# Patient Record
Sex: MALE | Race: WHITE | NOT HISPANIC OR LATINO | Employment: OTHER | URBAN - METROPOLITAN AREA
[De-identification: names, ages, dates, MRNs, and addresses within clinical notes are randomized per-mention and may not be internally consistent; named-entity substitution may affect disease eponyms.]

---

## 2023-08-07 PROBLEM — U07.1 COVID-19 VIRUS DETECTED: Status: ACTIVE | Noted: 2023-08-07

## 2023-08-07 PROBLEM — U07.1 DISEASE DUE TO SEVERE ACUTE RESPIRATORY SYNDROME CORONAVIRUS 2 (SARS-COV-2): Status: ACTIVE | Noted: 2023-08-07

## 2023-08-07 PROBLEM — J45.909 ASTHMA (HHS-HCC): Status: ACTIVE | Noted: 2023-08-07

## 2023-08-07 PROBLEM — J45.901 ASTHMA EXACERBATION (HHS-HCC): Status: ACTIVE | Noted: 2023-08-07

## 2023-08-07 PROBLEM — K63.5 COLON POLYPS: Status: ACTIVE | Noted: 2023-08-07

## 2023-08-07 PROBLEM — I10 HYPERTENSION: Status: ACTIVE | Noted: 2023-08-07

## 2023-08-07 PROBLEM — D12.6 COLON ADENOMA: Status: ACTIVE | Noted: 2023-08-07

## 2023-08-07 PROBLEM — R06.2 WHEEZING: Status: ACTIVE | Noted: 2023-08-07

## 2023-08-07 PROBLEM — F41.9 ANXIETY: Status: ACTIVE | Noted: 2023-08-07

## 2023-08-07 PROBLEM — F32.A DEPRESSION: Status: ACTIVE | Noted: 2023-08-07

## 2023-08-07 PROBLEM — M54.32 SCIATICA OF LEFT SIDE: Status: ACTIVE | Noted: 2023-08-07

## 2023-08-07 PROBLEM — I82.409 DVT (DEEP VENOUS THROMBOSIS) (MULTI): Status: ACTIVE | Noted: 2023-08-07

## 2023-08-07 PROBLEM — G47.00 INSOMNIA: Status: ACTIVE | Noted: 2023-08-07

## 2023-08-07 RX ORDER — BUDESONIDE AND FORMOTEROL FUMARATE DIHYDRATE 160; 4.5 UG/1; UG/1
AEROSOL RESPIRATORY (INHALATION) 2 TIMES DAILY
COMMUNITY
Start: 2019-08-08

## 2023-08-07 RX ORDER — PREDNISONE 20 MG/1
2 TABLET ORAL DAILY
COMMUNITY
Start: 2021-05-13 | End: 2023-08-21 | Stop reason: ALTCHOICE

## 2023-08-07 RX ORDER — AMLODIPINE BESYLATE 10 MG/1
1 TABLET ORAL DAILY
COMMUNITY
Start: 2019-09-06 | End: 2023-08-21 | Stop reason: ALTCHOICE

## 2023-08-07 RX ORDER — OXYCODONE AND ACETAMINOPHEN 5; 325 MG/1; MG/1
TABLET ORAL EVERY 6 HOURS
COMMUNITY
Start: 2021-09-21 | End: 2023-08-21 | Stop reason: ALTCHOICE

## 2023-08-07 RX ORDER — TRAZODONE HYDROCHLORIDE 100 MG/1
1 TABLET ORAL NIGHTLY PRN
COMMUNITY
Start: 2020-04-15

## 2023-08-07 RX ORDER — SOD SULF/POT CHLORIDE/MAG SULF 1.479 G
TABLET ORAL
COMMUNITY
Start: 2022-06-17 | End: 2023-08-21 | Stop reason: ALTCHOICE

## 2023-08-07 RX ORDER — MONTELUKAST SODIUM 10 MG/1
1 TABLET ORAL NIGHTLY
COMMUNITY
Start: 2019-09-06 | End: 2024-05-02

## 2023-08-07 RX ORDER — BUDESONIDE AND FORMOTEROL FUMARATE DIHYDRATE 80; 4.5 UG/1; UG/1
2 AEROSOL RESPIRATORY (INHALATION) 2 TIMES DAILY
COMMUNITY
Start: 2021-05-13

## 2023-08-07 RX ORDER — LEVOTHYROXINE SODIUM 75 UG/1
1 TABLET ORAL DAILY
COMMUNITY

## 2023-08-07 RX ORDER — ALBUTEROL SULFATE 0.83 MG/ML
SOLUTION RESPIRATORY (INHALATION)
COMMUNITY
Start: 2023-08-04

## 2023-08-07 RX ORDER — LEVOFLOXACIN 750 MG/1
1 TABLET ORAL DAILY
COMMUNITY
Start: 2019-08-08 | End: 2023-08-21 | Stop reason: ALTCHOICE

## 2023-08-07 RX ORDER — CETIRIZINE HYDROCHLORIDE 10 MG/1
1 TABLET ORAL DAILY
COMMUNITY

## 2023-08-07 RX ORDER — IBUPROFEN 200 MG
TABLET ORAL EVERY 24 HOURS
COMMUNITY
Start: 2019-08-08 | End: 2023-08-21 | Stop reason: ALTCHOICE

## 2023-08-07 RX ORDER — DESVENLAFAXINE 50 MG/1
1 TABLET, EXTENDED RELEASE ORAL DAILY
COMMUNITY
Start: 2023-01-26 | End: 2023-08-21 | Stop reason: ALTCHOICE

## 2023-08-07 RX ORDER — ASCORBIC ACID 500 MG
1 TABLET ORAL DAILY
COMMUNITY

## 2023-08-07 RX ORDER — FLUTICASONE FUROATE AND VILANTEROL 100; 25 UG/1; UG/1
1 POWDER RESPIRATORY (INHALATION) DAILY
COMMUNITY
Start: 2021-05-17

## 2023-08-21 ENCOUNTER — OFFICE VISIT (OUTPATIENT)
Dept: PRIMARY CARE | Facility: CLINIC | Age: 63
End: 2023-08-21
Payer: COMMERCIAL

## 2023-08-21 VITALS
BODY MASS INDEX: 28.77 KG/M2 | TEMPERATURE: 97.7 F | OXYGEN SATURATION: 94 % | HEART RATE: 63 BPM | DIASTOLIC BLOOD PRESSURE: 76 MMHG | WEIGHT: 179 LBS | HEIGHT: 66 IN | SYSTOLIC BLOOD PRESSURE: 134 MMHG

## 2023-08-21 DIAGNOSIS — F32.A DEPRESSION, UNSPECIFIED DEPRESSION TYPE: Primary | ICD-10-CM

## 2023-08-21 PROCEDURE — 99213 OFFICE O/P EST LOW 20 MIN: CPT | Performed by: FAMILY MEDICINE

## 2023-08-21 PROCEDURE — 3078F DIAST BP <80 MM HG: CPT | Performed by: FAMILY MEDICINE

## 2023-08-21 PROCEDURE — 3075F SYST BP GE 130 - 139MM HG: CPT | Performed by: FAMILY MEDICINE

## 2023-08-21 RX ORDER — ASPIRIN 81 MG/1
81 TABLET ORAL
COMMUNITY

## 2023-08-21 RX ORDER — BUPROPION HYDROCHLORIDE 300 MG/1
300 TABLET ORAL EVERY MORNING
Qty: 30 TABLET | Refills: 11 | Status: SHIPPED | OUTPATIENT
Start: 2023-08-21 | End: 2023-09-19 | Stop reason: ALTCHOICE

## 2023-08-21 RX ORDER — POLYETHYLENE GLYCOL 3350, SODIUM CHLORIDE, SODIUM BICARBONATE, POTASSIUM CHLORIDE 420; 11.2; 5.72; 1.48 G/4L; G/4L; G/4L; G/4L
POWDER, FOR SOLUTION ORAL
COMMUNITY
Start: 2019-01-21 | End: 2023-08-21 | Stop reason: ALTCHOICE

## 2023-08-21 RX ORDER — ATORVASTATIN CALCIUM 80 MG/1
80 TABLET, FILM COATED ORAL
COMMUNITY
Start: 2019-03-28

## 2023-08-21 ASSESSMENT — PAIN SCALES - GENERAL: PAINLEVEL: 0-NO PAIN

## 2023-08-21 NOTE — PATIENT INSTRUCTIONS
It was nice to see you today!  Discussed current concerns and addressed   Reviewed recent labs and diagnostics  Reviewed medications list  Continue to eat a healthy diet, exercise at least 3 times a week or more  Plan and follow up discussed  For any further information related to your condition, copy and paste or go to [email or web address]  Start welbutrin  Counseling highly recommended

## 2023-08-22 ASSESSMENT — ENCOUNTER SYMPTOMS
CONFUSION: 0
JOINT SWELLING: 0
PALPITATIONS: 0
DYSPHORIC MOOD: 1
DECREASED CONCENTRATION: 0
DIZZINESS: 0
FREQUENCY: 0
SORE THROAT: 0
BLOOD IN STOOL: 0
VOMITING: 0
UNEXPECTED WEIGHT CHANGE: 0
HEADACHES: 0
COUGH: 0
NUMBNESS: 0
FEVER: 0
FATIGUE: 0
NAUSEA: 0
DIARRHEA: 0
ABDOMINAL PAIN: 0
HEMATURIA: 0
WEAKNESS: 0
TROUBLE SWALLOWING: 0
DYSURIA: 0
SHORTNESS OF BREATH: 0
EYE PAIN: 0
HALLUCINATIONS: 0

## 2023-08-22 NOTE — PROGRESS NOTES
Subjective   Patient ID: Wiliam Sim is a 63 y.o. male.    Patient comes in today to discuss medications.  He has been going through a lot of depression recently, he is caring for elderly parents and he is never quite done well with serotonin reuptake inhibitors.  He was on Wellbutrin years ago and would like to try it again.  He is not suicidal or homicidal.  He has good social support.  He is not smoking or drinking excessively.        Review of Systems   Constitutional:  Negative for fatigue, fever and unexpected weight change.   HENT:  Negative for congestion, ear pain, hearing loss, sore throat and trouble swallowing.    Eyes:  Negative for pain and visual disturbance.   Respiratory:  Negative for cough and shortness of breath.    Cardiovascular:  Negative for chest pain, palpitations and leg swelling.   Gastrointestinal:  Negative for abdominal pain, blood in stool, diarrhea, nausea and vomiting.   Genitourinary:  Negative for dysuria, frequency, hematuria and urgency.   Musculoskeletal:  Negative for joint swelling.   Skin:  Negative for pallor and rash.   Neurological:  Negative for dizziness, syncope, weakness, numbness and headaches.   Psychiatric/Behavioral:  Positive for dysphoric mood. Negative for confusion, decreased concentration, hallucinations and suicidal ideas.      Vitals:    08/21/23 1513   BP: 134/76   Pulse: 63   Temp: 36.5 °C (97.7 °F)   SpO2: 94%      Objective   Physical Exam  Constitutional:       Appearance: Normal appearance.   Cardiovascular:      Rate and Rhythm: Normal rate and regular rhythm.      Heart sounds: Normal heart sounds.   Pulmonary:      Effort: Pulmonary effort is normal.      Breath sounds: Normal breath sounds.   Musculoskeletal:      Cervical back: Neck supple.   Skin:     General: Skin is warm and dry.   Neurological:      General: No focal deficit present.      Mental Status: He is alert.   Psychiatric:         Mood and Affect: Mood normal.         Speech:  Speech normal.         Behavior: Behavior normal.         Cognition and Memory: Cognition normal.         Assessment/Plan   Diagnoses and all orders for this visit:  Depression, unspecified depression type  -     buPROPion XL (Wellbutrin XL) 300 mg 24 hr tablet; Take 1 tablet (300 mg) by mouth once daily in the morning. Do not crush, chew, or split.

## 2023-09-18 ENCOUNTER — TELEPHONE (OUTPATIENT)
Dept: PRIMARY CARE | Facility: CLINIC | Age: 63
End: 2023-09-18
Payer: COMMERCIAL

## 2023-09-18 DIAGNOSIS — F41.9 ANXIETY: Primary | ICD-10-CM

## 2023-09-19 RX ORDER — BUPROPION HYDROCHLORIDE 75 MG/1
75 TABLET ORAL DAILY
Qty: 30 TABLET | Refills: 2 | Status: SHIPPED | OUTPATIENT
Start: 2023-09-19 | End: 2023-10-12

## 2023-10-11 DIAGNOSIS — F41.9 ANXIETY: ICD-10-CM

## 2023-10-12 RX ORDER — BUPROPION HYDROCHLORIDE 75 MG/1
75 TABLET ORAL DAILY
Qty: 90 TABLET | Refills: 1 | Status: SHIPPED | OUTPATIENT
Start: 2023-10-12 | End: 2023-12-21 | Stop reason: ALTCHOICE

## 2023-11-08 PROBLEM — J45.20 MILD INTERMITTENT ASTHMA WITHOUT COMPLICATION (HHS-HCC): Status: ACTIVE | Noted: 2017-11-22

## 2023-11-08 PROBLEM — E78.5 DYSLIPIDEMIA: Status: ACTIVE | Noted: 2017-11-22

## 2023-11-08 PROBLEM — Z86.73 HISTORY OF TIA (TRANSIENT ISCHEMIC ATTACK): Status: ACTIVE | Noted: 2017-11-22

## 2023-11-08 PROBLEM — E03.8 OTHER SPECIFIED HYPOTHYROIDISM: Status: ACTIVE | Noted: 2019-02-12

## 2023-11-08 RX ORDER — AMLODIPINE BESYLATE 10 MG/1
10 TABLET ORAL DAILY
COMMUNITY

## 2023-11-08 RX ORDER — PREDNISONE 20 MG/1
2 TABLET ORAL DAILY
COMMUNITY
End: 2024-01-09 | Stop reason: ALTCHOICE

## 2023-11-08 RX ORDER — DESVENLAFAXINE 50 MG/1
50 TABLET, EXTENDED RELEASE ORAL DAILY
COMMUNITY
End: 2024-01-09 | Stop reason: ALTCHOICE

## 2023-11-09 ENCOUNTER — HOSPITAL ENCOUNTER (OUTPATIENT)
Dept: RESPIRATORY THERAPY | Facility: HOSPITAL | Age: 63
End: 2023-11-09
Payer: COMMERCIAL

## 2023-11-09 DIAGNOSIS — J45.30 MILD PERSISTENT ASTHMA WITHOUT COMPLICATION (HHS-HCC): ICD-10-CM

## 2023-11-16 ENCOUNTER — HOSPITAL ENCOUNTER (OUTPATIENT)
Dept: RESPIRATORY THERAPY | Facility: HOSPITAL | Age: 63
End: 2023-11-16
Payer: COMMERCIAL

## 2023-12-08 ENCOUNTER — APPOINTMENT (OUTPATIENT)
Dept: PULMONOLOGY | Facility: CLINIC | Age: 63
End: 2023-12-08
Payer: COMMERCIAL

## 2023-12-18 ENCOUNTER — APPOINTMENT (OUTPATIENT)
Dept: RADIOLOGY | Facility: HOSPITAL | Age: 63
End: 2023-12-18
Payer: COMMERCIAL

## 2023-12-18 ENCOUNTER — APPOINTMENT (OUTPATIENT)
Dept: CARDIOLOGY | Facility: HOSPITAL | Age: 63
End: 2023-12-18
Payer: COMMERCIAL

## 2023-12-18 ENCOUNTER — HOSPITAL ENCOUNTER (EMERGENCY)
Facility: HOSPITAL | Age: 63
Discharge: HOME | End: 2023-12-18
Attending: STUDENT IN AN ORGANIZED HEALTH CARE EDUCATION/TRAINING PROGRAM
Payer: COMMERCIAL

## 2023-12-18 VITALS
HEIGHT: 66 IN | HEART RATE: 108 BPM | OXYGEN SATURATION: 93 % | BODY MASS INDEX: 29.73 KG/M2 | SYSTOLIC BLOOD PRESSURE: 142 MMHG | TEMPERATURE: 98.4 F | DIASTOLIC BLOOD PRESSURE: 84 MMHG | WEIGHT: 185 LBS | RESPIRATION RATE: 23 BRPM

## 2023-12-18 DIAGNOSIS — K70.31 ALCOHOLIC CIRRHOSIS OF LIVER WITH ASCITES (MULTI): ICD-10-CM

## 2023-12-18 DIAGNOSIS — F10.90 ALCOHOL USE DISORDER: Primary | ICD-10-CM

## 2023-12-18 LAB
ALBUMIN SERPL BCP-MCNC: 3.8 G/DL (ref 3.4–5)
ALP SERPL-CCNC: 77 U/L (ref 33–136)
ALT SERPL W P-5'-P-CCNC: 83 U/L (ref 10–52)
ANION GAP SERPL CALC-SCNC: 18 MMOL/L (ref 10–20)
APAP SERPL-MCNC: <10 UG/ML
AST SERPL W P-5'-P-CCNC: 163 U/L (ref 9–39)
BASOPHILS # BLD AUTO: 0.18 X10*3/UL (ref 0–0.1)
BASOPHILS NFR BLD AUTO: 1.7 %
BILIRUB SERPL-MCNC: 2.5 MG/DL (ref 0–1.2)
BUN SERPL-MCNC: 6 MG/DL (ref 6–23)
CALCIUM SERPL-MCNC: 8.7 MG/DL (ref 8.6–10.3)
CARDIAC TROPONIN I PNL SERPL HS: 8 NG/L (ref 0–20)
CHLORIDE SERPL-SCNC: 96 MMOL/L (ref 98–107)
CO2 SERPL-SCNC: 26 MMOL/L (ref 21–32)
CREAT SERPL-MCNC: 0.6 MG/DL (ref 0.5–1.3)
D DIMER PPP FEU-MCNC: 4548 NG/ML FEU
EOSINOPHIL # BLD AUTO: 0.22 X10*3/UL (ref 0–0.7)
EOSINOPHIL NFR BLD AUTO: 2 %
ERYTHROCYTE [DISTWIDTH] IN BLOOD BY AUTOMATED COUNT: 13.3 % (ref 11.5–14.5)
ETHANOL SERPL-MCNC: 368 MG/DL
GFR SERPL CREATININE-BSD FRML MDRD: >90 ML/MIN/1.73M*2
GLUCOSE SERPL-MCNC: 149 MG/DL (ref 74–99)
HCT VFR BLD AUTO: 40.7 % (ref 41–52)
HGB BLD-MCNC: 13.9 G/DL (ref 13.5–17.5)
IMM GRANULOCYTES # BLD AUTO: 0.05 X10*3/UL (ref 0–0.7)
IMM GRANULOCYTES NFR BLD AUTO: 0.5 % (ref 0–0.9)
LYMPHOCYTES # BLD AUTO: 1.91 X10*3/UL (ref 1.2–4.8)
LYMPHOCYTES NFR BLD AUTO: 17.7 %
MAGNESIUM SERPL-MCNC: 1.69 MG/DL (ref 1.6–2.4)
MCH RBC QN AUTO: 35 PG (ref 26–34)
MCHC RBC AUTO-ENTMCNC: 34.2 G/DL (ref 32–36)
MCV RBC AUTO: 103 FL (ref 80–100)
MONOCYTES # BLD AUTO: 0.84 X10*3/UL (ref 0.1–1)
MONOCYTES NFR BLD AUTO: 7.8 %
NEUTROPHILS # BLD AUTO: 7.62 X10*3/UL (ref 1.2–7.7)
NEUTROPHILS NFR BLD AUTO: 70.3 %
NRBC BLD-RTO: 0 /100 WBCS (ref 0–0)
PHOSPHATE SERPL-MCNC: 2.8 MG/DL (ref 2.5–4.9)
PLATELET # BLD AUTO: 159 X10*3/UL (ref 150–450)
POTASSIUM SERPL-SCNC: 3.6 MMOL/L (ref 3.5–5.3)
PROT SERPL-MCNC: 7.5 G/DL (ref 6.4–8.2)
RBC # BLD AUTO: 3.97 X10*6/UL (ref 4.5–5.9)
SALICYLATES SERPL-MCNC: <3 MG/DL
SODIUM SERPL-SCNC: 136 MMOL/L (ref 136–145)
WBC # BLD AUTO: 10.8 X10*3/UL (ref 4.4–11.3)

## 2023-12-18 PROCEDURE — 70450 CT HEAD/BRAIN W/O DYE: CPT | Performed by: RADIOLOGY

## 2023-12-18 PROCEDURE — 80143 DRUG ASSAY ACETAMINOPHEN: CPT | Performed by: STUDENT IN AN ORGANIZED HEALTH CARE EDUCATION/TRAINING PROGRAM

## 2023-12-18 PROCEDURE — 71045 X-RAY EXAM CHEST 1 VIEW: CPT | Performed by: RADIOLOGY

## 2023-12-18 PROCEDURE — 99285 EMERGENCY DEPT VISIT HI MDM: CPT | Performed by: STUDENT IN AN ORGANIZED HEALTH CARE EDUCATION/TRAINING PROGRAM

## 2023-12-18 PROCEDURE — 71275 CT ANGIOGRAPHY CHEST: CPT

## 2023-12-18 PROCEDURE — 70450 CT HEAD/BRAIN W/O DYE: CPT

## 2023-12-18 PROCEDURE — 84484 ASSAY OF TROPONIN QUANT: CPT | Performed by: STUDENT IN AN ORGANIZED HEALTH CARE EDUCATION/TRAINING PROGRAM

## 2023-12-18 PROCEDURE — 83735 ASSAY OF MAGNESIUM: CPT | Performed by: STUDENT IN AN ORGANIZED HEALTH CARE EDUCATION/TRAINING PROGRAM

## 2023-12-18 PROCEDURE — 85025 COMPLETE CBC W/AUTO DIFF WBC: CPT | Performed by: STUDENT IN AN ORGANIZED HEALTH CARE EDUCATION/TRAINING PROGRAM

## 2023-12-18 PROCEDURE — 85379 FIBRIN DEGRADATION QUANT: CPT | Performed by: STUDENT IN AN ORGANIZED HEALTH CARE EDUCATION/TRAINING PROGRAM

## 2023-12-18 PROCEDURE — 36415 COLL VENOUS BLD VENIPUNCTURE: CPT | Performed by: STUDENT IN AN ORGANIZED HEALTH CARE EDUCATION/TRAINING PROGRAM

## 2023-12-18 PROCEDURE — 84100 ASSAY OF PHOSPHORUS: CPT | Performed by: STUDENT IN AN ORGANIZED HEALTH CARE EDUCATION/TRAINING PROGRAM

## 2023-12-18 PROCEDURE — 71275 CT ANGIOGRAPHY CHEST: CPT | Performed by: STUDENT IN AN ORGANIZED HEALTH CARE EDUCATION/TRAINING PROGRAM

## 2023-12-18 PROCEDURE — 2550000001 HC RX 255 CONTRASTS: Performed by: STUDENT IN AN ORGANIZED HEALTH CARE EDUCATION/TRAINING PROGRAM

## 2023-12-18 PROCEDURE — 93005 ELECTROCARDIOGRAM TRACING: CPT

## 2023-12-18 PROCEDURE — 80053 COMPREHEN METABOLIC PANEL: CPT | Performed by: STUDENT IN AN ORGANIZED HEALTH CARE EDUCATION/TRAINING PROGRAM

## 2023-12-18 PROCEDURE — 71045 X-RAY EXAM CHEST 1 VIEW: CPT

## 2023-12-18 RX ORDER — LORAZEPAM 1 MG/1
TABLET ORAL
Qty: 7 TABLET | Refills: 0 | Status: SHIPPED | OUTPATIENT
Start: 2023-12-18 | End: 2023-12-18 | Stop reason: SDUPTHER

## 2023-12-18 RX ORDER — LORAZEPAM 1 MG/1
TABLET ORAL
Qty: 7 TABLET | Refills: 0 | Status: SHIPPED | OUTPATIENT
Start: 2023-12-18 | End: 2024-01-09 | Stop reason: ALTCHOICE

## 2023-12-18 RX ADMIN — IOHEXOL 64 ML: 350 INJECTION, SOLUTION INTRAVENOUS at 20:33

## 2023-12-18 ASSESSMENT — COLUMBIA-SUICIDE SEVERITY RATING SCALE - C-SSRS
6. HAVE YOU EVER DONE ANYTHING, STARTED TO DO ANYTHING, OR PREPARED TO DO ANYTHING TO END YOUR LIFE?: NO
1. IN THE PAST MONTH, HAVE YOU WISHED YOU WERE DEAD OR WISHED YOU COULD GO TO SLEEP AND NOT WAKE UP?: NO
2. HAVE YOU ACTUALLY HAD ANY THOUGHTS OF KILLING YOURSELF?: NO

## 2023-12-18 ASSESSMENT — LIFESTYLE VARIABLES
EVER FELT BAD OR GUILTY ABOUT YOUR DRINKING: NO
EVER HAD A DRINK FIRST THING IN THE MORNING TO STEADY YOUR NERVES TO GET RID OF A HANGOVER: NO
REASON UNABLE TO ASSESS: NO
HAVE PEOPLE ANNOYED YOU BY CRITICIZING YOUR DRINKING: NO
HAVE YOU EVER FELT YOU SHOULD CUT DOWN ON YOUR DRINKING: NO

## 2023-12-18 ASSESSMENT — PAIN SCALES - GENERAL: PAINLEVEL_OUTOF10: 0 - NO PAIN

## 2023-12-18 NOTE — ED TRIAGE NOTES
BIB EMS, family called s/t pt having 30 second LOC and speech abnormalities at 1800. SPO2 low en route, put on 2 LPM NC. Helen scale negative en route, AxOx4, speech clear and VSS en route. Pt has h/o TIA. Provider at bedside. Pt admits to ETOH use today, states he drinks 1-2 vodka socially, denies ETOH abuse. Everyday smoker as well. Pt states his family told him he fell. Pt does not remember falling or if he hit his head. Denies thinners.

## 2023-12-19 NOTE — ED PROVIDER NOTES
CC: Transient Ischemic Attack     HPI:  Patient is a 63-year-old male who presents to the emergency department an episode of syncope.  EMS called with concern for TIA.  They states he was [place] negative and Van negative on their assessment but they were called for an episode of syncope that lasted for a few seconds.  Patient is alert and oriented x 4 for EMS.  Nonfocal neurologic exam.  They states he did endorse slight chest pain and shortness of breath and route.  Patient denies alcohol use.  He states he smokes cigarettes on a daily basis but denies other drug use.  Patient states he has been under a lot of stress as his mother-in-law recently passed away.  He states he was just tired.    Records Reviewed:  Recent available ED and inpatient notes reviewed in EMR.    PMHx/PSHx:  Per HPI.   - has a past medical history of Personal history of pneumonia (recurrent) and Solitary pulmonary nodule.  - has a past surgical history that includes MR angio head wo IV contrast (10/2/2017) and MR angio neck wo IV contrast (10/2/2017).  - has Anxiety; Asthma exacerbation; Asthma; Colon adenoma; Colon polyps; COVID-19 virus detected; Depression; Disease due to severe acute respiratory syndrome coronavirus 2 (SARS-CoV-2); DVT (deep venous thrombosis) (CMS/[facility]); Hypertension; Insomnia; Sciatica of left side; Wheezing; Dyslipidemia; History of TIA (transient ischemic attack); Mild intermittent asthma without complication; and Other specified hypothyroidism on their problem list.    Medications:  Reviewed in EMR. See EMR for complete list of medications and doses.    Allergies:  Azithromycin    Social History:  - Tobacco:  reports that he has never smoked. He has never used smokeless tobacco.   - Alcohol:  has no history on file for alcohol use.   - Illicit Drugs:  has no history on file for drug use.     ROS:  Per HPI.       ???????????????????????????????????????????????????????????????  Triage Vitals:  T 36.9 °C (98.4 °F)     BP (!) 158/96  RR 20  O2 (!) 87 % (S) None (Room air) (placed on 2 LPM NC)    Physical Exam  ???????????????????????????????????????????????????????????????  GEN: Appears intoxicated on  HEAD: atraumatic  EYES: Mild scleral icterus pupils equal round and reactive.  Fatigable nystagmus..   ENT: mmm  NECK: no JVD  CVS/CHEST: reg rate, nl rhythm  PULM: CTA b/l no wheezes, crackles, or rhonchi   GI: soft, NT/ND, no rebound or guarding   BACK: no CVA tenderness, no vertebral point tenderness  EXT: no LE edema, 2+ periph pulses in bilat radial and DP   NEURO: NIH 0.  No drift.  No facial droop.  Normal repetition.  Normal finger-nose-finger.  Normal heel-to-shin.  Normal sensation.  No weakness.  Answering questions appropriately.  Following commands appropriately.  SKIN: warm, dry  PSYCH: AAOx3 answers questions appropriately    EKG:  EKG read by me reviewed by me is sinus tachycardia 101 bpm.  Normal axis.  Normal intervals.  No ST segment elevation or depression.  T wave inversions noted in lead III.      Assessment and Plan:  63-year-old male presents to the emergency department with an episode of syncope.   patient denies alcohol use but clinically appears intoxicated.  Therefore acute toxicology panel sent did have an alcohol level of 368.  However he was also tachycardic and mildly hypoxic with a heart rate of 120 and an oxygen saturation of 88% on arrival.  Given his smoking history a D-dimer sent which was markedly elevated and a CT PE ordered.  CT of his head negative.  He has a nonfocal neurologic exam and I have a low suspicion for stroke.  Likely secondary to alcohol use.  Disposition pending workup at this time.  Patient has a markedly elevated alcohol level.  This was discussed with patient and family at bedside.  His workup in the emergency department regarding CT and CT head unremarkable.  Family at bedside.  Patient requesting pharmaceutical options to help quit.  He was written for an Ativan  taper after long discussion regarding Ativan versus naloxone.  Patient instructed not to drink while on the Ativan as it can cause respiratory depression.  Patient expressed understanding.  Encouraged to follow-up with [facility] and [place].  Discussed results of cirrhosis and lab abnormalities.  Patient expressed understanding.  Also encouraged to follow-up with GI.  Clinically sober on reevaluation and sober ride at bedside.    ED Course:  Diagnoses as of 12/18/23 2312   Alcohol use disorder   Alcoholic cirrhosis of liver with ascites (CMS/HCC)       Social Determinants Limiting Care:  Alcohol use disorder per family at bedside     Disposition:  Discharged in stable condition with return precautions    Maryan Charles, DO      Procedures ? [employer] last updated 12/18/2023 8:36 PM        Maryan Charles, DO  12/18/23 6287

## 2023-12-21 ENCOUNTER — OFFICE VISIT (OUTPATIENT)
Dept: PRIMARY CARE | Facility: CLINIC | Age: 63
End: 2023-12-21
Payer: COMMERCIAL

## 2023-12-21 VITALS
HEART RATE: 107 BPM | OXYGEN SATURATION: 94 % | DIASTOLIC BLOOD PRESSURE: 96 MMHG | HEIGHT: 66 IN | BODY MASS INDEX: 27.64 KG/M2 | SYSTOLIC BLOOD PRESSURE: 144 MMHG | TEMPERATURE: 96.4 F | WEIGHT: 172 LBS

## 2023-12-21 DIAGNOSIS — F10.10 ALCOHOL ABUSE: Primary | ICD-10-CM

## 2023-12-21 DIAGNOSIS — F41.9 ANXIETY: ICD-10-CM

## 2023-12-21 DIAGNOSIS — R79.89 ELEVATED LIVER FUNCTION TESTS: ICD-10-CM

## 2023-12-21 PROCEDURE — 3080F DIAST BP >= 90 MM HG: CPT | Performed by: FAMILY MEDICINE

## 2023-12-21 PROCEDURE — 99214 OFFICE O/P EST MOD 30 MIN: CPT | Performed by: FAMILY MEDICINE

## 2023-12-21 PROCEDURE — 1036F TOBACCO NON-USER: CPT | Performed by: FAMILY MEDICINE

## 2023-12-21 PROCEDURE — 3077F SYST BP >= 140 MM HG: CPT | Performed by: FAMILY MEDICINE

## 2023-12-21 RX ORDER — ESCITALOPRAM OXALATE 10 MG/1
10 TABLET ORAL DAILY
Qty: 30 TABLET | Refills: 5 | Status: SHIPPED | OUTPATIENT
Start: 2023-12-21 | End: 2024-01-08 | Stop reason: SDUPTHER

## 2023-12-21 RX ORDER — NALTREXONE HYDROCHLORIDE 50 MG/1
50 TABLET, FILM COATED ORAL DAILY
Qty: 30 TABLET | Refills: 11 | Status: SHIPPED | OUTPATIENT
Start: 2023-12-21 | End: 2024-12-20

## 2023-12-21 ASSESSMENT — ENCOUNTER SYMPTOMS
CONFUSION: 0
UNEXPECTED WEIGHT CHANGE: 0
DYSPHORIC MOOD: 1
SORE THROAT: 0
DECREASED CONCENTRATION: 0
SHORTNESS OF BREATH: 0
AGITATION: 1
NERVOUS/ANXIOUS: 1
TROUBLE SWALLOWING: 0
DYSURIA: 0
APPETITE CHANGE: 1
EYE PAIN: 0
JOINT SWELLING: 0
BLOOD IN STOOL: 0
DIARRHEA: 0
ABDOMINAL PAIN: 0
PALPITATIONS: 0
NAUSEA: 0
HEADACHES: 0
HALLUCINATIONS: 0
NUMBNESS: 0
FREQUENCY: 0
VOMITING: 0
WEAKNESS: 0
HEMATURIA: 0
FEVER: 0
DIZZINESS: 0
FATIGUE: 0
COUGH: 0

## 2023-12-21 NOTE — PROGRESS NOTES
Subjective   Patient ID: Wiliam Sim is a 63 y.o. male.    Patient comes in today for follow-up.  He is admittedly had a problem with alcohol.  He had a recent visit to the ER.  They gave him Ativan to detox.  He is really in a fog today.  He has had no seizures.  He is here with his wife.  Blood pressure and heart rate are little bit up.  He has a long history of anxiety.  He has tried a few medications.  Somewhat worked in some have not.  He had good luck with Lexapro.  They have been through a lot at home with the death in the family and what sounds like caregiver burnout.  They would like some guidance on recovery.        Review of Systems   Constitutional:  Positive for appetite change. Negative for fatigue, fever and unexpected weight change.   HENT:  Negative for congestion, ear pain, hearing loss, sore throat and trouble swallowing.    Eyes:  Negative for pain and visual disturbance.   Respiratory:  Negative for cough and shortness of breath.    Cardiovascular:  Negative for chest pain, palpitations and leg swelling.   Gastrointestinal:  Negative for abdominal pain, blood in stool, diarrhea, nausea and vomiting.   Genitourinary:  Negative for dysuria, frequency, hematuria and urgency.   Musculoskeletal:  Negative for joint swelling.   Skin:  Negative for pallor and rash.   Neurological:  Negative for dizziness, syncope, weakness, numbness and headaches.   Psychiatric/Behavioral:  Positive for agitation and dysphoric mood. Negative for confusion, decreased concentration, hallucinations and suicidal ideas. The patient is nervous/anxious.      Vitals:    12/21/23 0835   BP: (!) 144/96   Pulse: 107   Temp: 35.8 °C (96.4 °F)   SpO2: 94%      Objective   Physical Exam  Constitutional:       Appearance: Normal appearance.   Cardiovascular:      Rate and Rhythm: Normal rate and regular rhythm.      Heart sounds: Normal heart sounds.   Pulmonary:      Effort: Pulmonary effort is normal.      Breath sounds: Normal  breath sounds.   Musculoskeletal:      Cervical back: Neck supple.   Skin:     General: Skin is warm and dry.   Neurological:      General: No focal deficit present.      Mental Status: He is alert.   Psychiatric:         Speech: Speech normal.         Cognition and Memory: Cognition normal.      Comments: He is very quiet.  He looks a little overwhelmed.  He is very open to doing necessary steps for recovery.         Assessment/Plan   Diagnoses and all orders for this visit:  Alcohol abuse  -     escitalopram (Lexapro) 10 mg tablet; Take 1 tablet (10 mg) by mouth once daily.  -     naltrexone (Depade) 50 mg tablet; Take 1 tablet (50 mg) by mouth once daily.  -     Comprehensive metabolic panel; Future  -     Opiate/Opioid/Benzo Prescription Compliance; Future  -     Referral to Hepatology; Future

## 2023-12-21 NOTE — PATIENT INSTRUCTIONS
Had a long discussion with he and his wife about the first steps in recovery.  He needs to detoxify.  He is going to finish the Ativan and then we will try and get off of it or use for only as needed.  Drug screen done today.  We will start Lexapro.  I gave him a handout for [facility], a cognitive therapy based recovery plan as an alternative to AA.  He can go to meetings, he can go online and he can get information about how to change behaviors.  I will start him on naltrexone 50 mg daily for cravings as well.  I will see him back in a couple weeks.  Precautions discussed.  I do not feel he is suicidal.

## 2024-01-08 ENCOUNTER — OFFICE VISIT (OUTPATIENT)
Dept: PRIMARY CARE | Facility: CLINIC | Age: 64
End: 2024-01-08
Payer: COMMERCIAL

## 2024-01-08 VITALS
WEIGHT: 172 LBS | BODY MASS INDEX: 27.76 KG/M2 | DIASTOLIC BLOOD PRESSURE: 70 MMHG | OXYGEN SATURATION: 94 % | HEART RATE: 59 BPM | TEMPERATURE: 97.2 F | SYSTOLIC BLOOD PRESSURE: 130 MMHG

## 2024-01-08 DIAGNOSIS — K70.31 ALCOHOLIC CIRRHOSIS OF LIVER WITH ASCITES (MULTI): Primary | ICD-10-CM

## 2024-01-08 DIAGNOSIS — F10.20 ALCOHOLISM (MULTI): ICD-10-CM

## 2024-01-08 DIAGNOSIS — F41.9 ANXIETY: ICD-10-CM

## 2024-01-08 DIAGNOSIS — F10.10 ALCOHOL ABUSE: ICD-10-CM

## 2024-01-08 DIAGNOSIS — J45.20 MILD INTERMITTENT ASTHMA WITHOUT COMPLICATION (HHS-HCC): ICD-10-CM

## 2024-01-08 PROCEDURE — 3075F SYST BP GE 130 - 139MM HG: CPT | Performed by: FAMILY MEDICINE

## 2024-01-08 PROCEDURE — 1036F TOBACCO NON-USER: CPT | Performed by: FAMILY MEDICINE

## 2024-01-08 PROCEDURE — 99213 OFFICE O/P EST LOW 20 MIN: CPT | Performed by: FAMILY MEDICINE

## 2024-01-08 PROCEDURE — 3078F DIAST BP <80 MM HG: CPT | Performed by: FAMILY MEDICINE

## 2024-01-08 RX ORDER — ESCITALOPRAM OXALATE 10 MG/1
10 TABLET ORAL DAILY
Qty: 90 TABLET | Refills: 1 | Status: SHIPPED | OUTPATIENT
Start: 2024-01-08 | End: 2024-05-01 | Stop reason: SDUPTHER

## 2024-01-08 ASSESSMENT — PAIN SCALES - GENERAL: PAINLEVEL: 0-NO PAIN

## 2024-01-08 NOTE — PROGRESS NOTES
Subjective   Patient ID: Wiliam Sim is a 63 y.o. male.    Patient comes in today for follow-up.  He has been abusing alcohol for decades and has quit again recently.  He was hospitalized.  It was noted on CT of the chest that he had changes consistent with cirrhosis on CT scan.  He has an appointment this spring with GI and hepatology.  He is on S-Citalopram to help with underlying anxiety.  I have given him the name of a counselor but he has declined to see anybody about that.  He is not in a support group such as [employer] or [facility] but he does have a friend in recovery that he talks to.  He has not drank since hospitalization last month.  His wife is a  and is intermittently out of town.  States he is doing very well and is committed to his recovery        Review of Systems   Constitutional:  Negative for fatigue, fever and unexpected weight change.   HENT:  Negative for congestion, ear pain, hearing loss, sore throat and trouble swallowing.    Eyes:  Negative for pain and visual disturbance.   Respiratory:  Negative for cough and shortness of breath.    Cardiovascular:  Negative for chest pain, palpitations and leg swelling.   Gastrointestinal:  Negative for abdominal pain, blood in stool, diarrhea, nausea and vomiting.   Genitourinary:  Negative for dysuria, frequency, hematuria and urgency.   Musculoskeletal:  Negative for joint swelling.   Skin:  Negative for pallor and rash.   Neurological:  Negative for dizziness, syncope, weakness, numbness and headaches.   Psychiatric/Behavioral:  Negative for confusion, decreased concentration, hallucinations and suicidal ideas.      Vitals:    01/08/24 1541   BP: 130/70   Pulse: 59   Temp: 36.2 °C (97.2 °F)   SpO2: 94%      Objective   Physical Exam    Assessment/Plan   Diagnoses and all orders for this visit:  Alcoholic cirrhosis of liver with ascites (CMS/HCC)  Alcohol abuse  -     escitalopram (Lexapro) 10 mg tablet; Take 1 tablet (10 mg) by  mouth once daily.  Mild intermittent asthma without complication

## 2024-01-09 PROBLEM — S92.309A FRACTURE OF METATARSAL BONE: Status: ACTIVE | Noted: 2024-01-09

## 2024-01-09 PROBLEM — F10.20 ALCOHOLISM (MULTI): Status: ACTIVE | Noted: 2024-01-09

## 2024-01-09 RX ORDER — LIOTHYRONINE SODIUM 5 UG/1
TABLET ORAL
COMMUNITY

## 2024-01-09 ASSESSMENT — ENCOUNTER SYMPTOMS
DIZZINESS: 0
VOMITING: 0
COUGH: 0
WEAKNESS: 0
UNEXPECTED WEIGHT CHANGE: 0
FREQUENCY: 0
NUMBNESS: 0
JOINT SWELLING: 0
HEMATURIA: 0
SHORTNESS OF BREATH: 0
PALPITATIONS: 0
CONFUSION: 0
EYE PAIN: 0
FATIGUE: 0
HALLUCINATIONS: 0
TROUBLE SWALLOWING: 0
HEADACHES: 0
NAUSEA: 0
DIARRHEA: 0
BLOOD IN STOOL: 0
DYSURIA: 0
SORE THROAT: 0
ABDOMINAL PAIN: 0
FEVER: 0
DECREASED CONCENTRATION: 0

## 2024-01-10 LAB
ATRIAL RATE: 101 BPM
P AXIS: 35 DEGREES
P OFFSET: 198 MS
P ONSET: 141 MS
PR INTERVAL: 158 MS
Q ONSET: 220 MS
QRS COUNT: 17 BEATS
QRS DURATION: 82 MS
QT INTERVAL: 382 MS
QTC CALCULATION(BAZETT): 495 MS
QTC FREDERICIA: 454 MS
R AXIS: 11 DEGREES
T AXIS: 24 DEGREES
T OFFSET: 411 MS
VENTRICULAR RATE: 101 BPM

## 2024-02-08 ENCOUNTER — APPOINTMENT (OUTPATIENT)
Dept: GASTROENTEROLOGY | Facility: CLINIC | Age: 64
End: 2024-02-08
Payer: COMMERCIAL

## 2024-04-03 ENCOUNTER — TELEPHONE (OUTPATIENT)
Dept: PRIMARY CARE | Facility: CLINIC | Age: 64
End: 2024-04-03
Payer: COMMERCIAL

## 2024-04-03 NOTE — TELEPHONE ENCOUNTER
Pt called stating at his last visit back in December he was supposed to go see gastro [facility] then he has had two appts and both were canceled by the provider. He wants to know what he should do or if there is someone else he should go see.

## 2024-04-04 NOTE — TELEPHONE ENCOUNTER
Left message for patient to contact our office for alternative number for referral.     Try [facility]. See if they have sooner than [facility] office. Their number is [phone]. Anyone in this group is good. jason Cerna, etc

## 2024-04-18 ENCOUNTER — APPOINTMENT (OUTPATIENT)
Dept: PRIMARY CARE | Facility: CLINIC | Age: 64
End: 2024-04-18
Payer: COMMERCIAL

## 2024-04-18 ENCOUNTER — APPOINTMENT (OUTPATIENT)
Dept: GASTROENTEROLOGY | Facility: CLINIC | Age: 64
End: 2024-04-18
Payer: COMMERCIAL

## 2024-05-01 ENCOUNTER — TELEPHONE (OUTPATIENT)
Dept: PRIMARY CARE | Facility: CLINIC | Age: 64
End: 2024-05-01
Payer: COMMERCIAL

## 2024-05-01 DIAGNOSIS — F10.10 ALCOHOL ABUSE: ICD-10-CM

## 2024-05-01 RX ORDER — ESCITALOPRAM OXALATE 10 MG/1
10 TABLET ORAL DAILY
Qty: 90 TABLET | Refills: 1 | Status: SHIPPED | OUTPATIENT
Start: 2024-05-01 | End: 2024-05-02 | Stop reason: SDUPTHER

## 2024-05-01 NOTE — TELEPHONE ENCOUNTER
Patient was on 10 mg daily of escitalopram. States about a month ago, started taking 1.5 tablets daily. Wanting a new script for 15 mg dosage.

## 2024-05-02 DIAGNOSIS — F10.10 ALCOHOL ABUSE: ICD-10-CM

## 2024-05-02 DIAGNOSIS — J45.909 UNSPECIFIED ASTHMA, UNCOMPLICATED (HHS-HCC): ICD-10-CM

## 2024-05-02 RX ORDER — MONTELUKAST SODIUM 10 MG/1
10 TABLET ORAL NIGHTLY
Qty: 90 TABLET | Refills: 1 | Status: SHIPPED | OUTPATIENT
Start: 2024-05-02

## 2024-05-02 RX ORDER — ESCITALOPRAM OXALATE 10 MG/1
15 TABLET ORAL DAILY
Qty: 135 TABLET | Refills: 1 | Status: SHIPPED | OUTPATIENT
Start: 2024-05-02 | End: 2024-05-16 | Stop reason: SDUPTHER

## 2024-05-16 DIAGNOSIS — F10.10 ALCOHOL ABUSE: ICD-10-CM

## 2024-05-16 RX ORDER — ESCITALOPRAM OXALATE 10 MG/1
15 TABLET ORAL DAILY
Qty: 135 TABLET | Refills: 1 | Status: SHIPPED | OUTPATIENT
Start: 2024-05-16 | End: 2024-11-12

## 2024-06-02 ENCOUNTER — HOSPITAL ENCOUNTER (EMERGENCY)
Facility: HOSPITAL | Age: 64
Discharge: HOME | End: 2024-06-02
Attending: STUDENT IN AN ORGANIZED HEALTH CARE EDUCATION/TRAINING PROGRAM
Payer: COMMERCIAL

## 2024-06-02 ENCOUNTER — APPOINTMENT (OUTPATIENT)
Dept: CARDIOLOGY | Facility: HOSPITAL | Age: 64
End: 2024-06-02
Payer: COMMERCIAL

## 2024-06-02 ENCOUNTER — APPOINTMENT (OUTPATIENT)
Dept: RADIOLOGY | Facility: HOSPITAL | Age: 64
End: 2024-06-02
Payer: COMMERCIAL

## 2024-06-02 VITALS
RESPIRATION RATE: 97 BRPM | HEIGHT: 66 IN | DIASTOLIC BLOOD PRESSURE: 79 MMHG | OXYGEN SATURATION: 99 % | TEMPERATURE: 98.4 F | HEART RATE: 87 BPM | BODY MASS INDEX: 28.12 KG/M2 | WEIGHT: 175 LBS | SYSTOLIC BLOOD PRESSURE: 156 MMHG

## 2024-06-02 DIAGNOSIS — S20.212A CONTUSION OF RIB ON LEFT SIDE, INITIAL ENCOUNTER: Primary | ICD-10-CM

## 2024-06-02 LAB
ALBUMIN SERPL BCP-MCNC: 3.5 G/DL (ref 3.4–5)
ALP SERPL-CCNC: 94 U/L (ref 33–136)
ALT SERPL W P-5'-P-CCNC: 47 U/L (ref 10–52)
ANION GAP SERPL CALC-SCNC: 12 MMOL/L (ref 10–20)
APTT PPP: 38 SECONDS (ref 27–38)
AST SERPL W P-5'-P-CCNC: 78 U/L (ref 9–39)
BASOPHILS # BLD AUTO: 0.09 X10*3/UL (ref 0–0.1)
BASOPHILS NFR BLD AUTO: 1.2 %
BILIRUB DIRECT SERPL-MCNC: 0.7 MG/DL (ref 0–0.3)
BILIRUB SERPL-MCNC: 3.1 MG/DL (ref 0–1.2)
BUN SERPL-MCNC: 10 MG/DL (ref 6–23)
CALCIUM SERPL-MCNC: 9 MG/DL (ref 8.6–10.3)
CHLORIDE SERPL-SCNC: 100 MMOL/L (ref 98–107)
CO2 SERPL-SCNC: 25 MMOL/L (ref 21–32)
CREAT SERPL-MCNC: 0.69 MG/DL (ref 0.5–1.3)
EGFRCR SERPLBLD CKD-EPI 2021: >90 ML/MIN/1.73M*2
EOSINOPHIL # BLD AUTO: 0.17 X10*3/UL (ref 0–0.7)
EOSINOPHIL NFR BLD AUTO: 2.2 %
ERYTHROCYTE [DISTWIDTH] IN BLOOD BY AUTOMATED COUNT: 15.6 % (ref 11.5–14.5)
GLUCOSE SERPL-MCNC: 95 MG/DL (ref 74–99)
HCT VFR BLD AUTO: 35.6 % (ref 41–52)
HGB BLD-MCNC: 12.1 G/DL (ref 13.5–17.5)
IMM GRANULOCYTES # BLD AUTO: 0.02 X10*3/UL (ref 0–0.7)
IMM GRANULOCYTES NFR BLD AUTO: 0.3 % (ref 0–0.9)
INR PPP: 1.6 (ref 0.9–1.1)
LIPASE SERPL-CCNC: 70 U/L (ref 9–82)
LYMPHOCYTES # BLD AUTO: 1.58 X10*3/UL (ref 1.2–4.8)
LYMPHOCYTES NFR BLD AUTO: 20.6 %
MCH RBC QN AUTO: 33 PG (ref 26–34)
MCHC RBC AUTO-ENTMCNC: 34 G/DL (ref 32–36)
MCV RBC AUTO: 97 FL (ref 80–100)
MONOCYTES # BLD AUTO: 1.21 X10*3/UL (ref 0.1–1)
MONOCYTES NFR BLD AUTO: 15.8 %
NEUTROPHILS # BLD AUTO: 4.61 X10*3/UL (ref 1.2–7.7)
NEUTROPHILS NFR BLD AUTO: 59.9 %
NRBC BLD-RTO: 0 /100 WBCS (ref 0–0)
PLATELET # BLD AUTO: 129 X10*3/UL (ref 150–450)
POTASSIUM SERPL-SCNC: 3.9 MMOL/L (ref 3.5–5.3)
PROT SERPL-MCNC: 6.5 G/DL (ref 6.4–8.2)
PROTHROMBIN TIME: 18.5 SECONDS (ref 9.8–12.8)
RBC # BLD AUTO: 3.67 X10*6/UL (ref 4.5–5.9)
SODIUM SERPL-SCNC: 133 MMOL/L (ref 136–145)
WBC # BLD AUTO: 7.7 X10*3/UL (ref 4.4–11.3)

## 2024-06-02 PROCEDURE — 85025 COMPLETE CBC W/AUTO DIFF WBC: CPT | Performed by: STUDENT IN AN ORGANIZED HEALTH CARE EDUCATION/TRAINING PROGRAM

## 2024-06-02 PROCEDURE — 83690 ASSAY OF LIPASE: CPT | Performed by: STUDENT IN AN ORGANIZED HEALTH CARE EDUCATION/TRAINING PROGRAM

## 2024-06-02 PROCEDURE — 2500000005 HC RX 250 GENERAL PHARMACY W/O HCPCS: Performed by: STUDENT IN AN ORGANIZED HEALTH CARE EDUCATION/TRAINING PROGRAM

## 2024-06-02 PROCEDURE — 71101 X-RAY EXAM UNILAT RIBS/CHEST: CPT | Mod: LEFT SIDE | Performed by: RADIOLOGY

## 2024-06-02 PROCEDURE — 85610 PROTHROMBIN TIME: CPT | Performed by: STUDENT IN AN ORGANIZED HEALTH CARE EDUCATION/TRAINING PROGRAM

## 2024-06-02 PROCEDURE — 85730 THROMBOPLASTIN TIME PARTIAL: CPT | Performed by: STUDENT IN AN ORGANIZED HEALTH CARE EDUCATION/TRAINING PROGRAM

## 2024-06-02 PROCEDURE — 82248 BILIRUBIN DIRECT: CPT | Performed by: STUDENT IN AN ORGANIZED HEALTH CARE EDUCATION/TRAINING PROGRAM

## 2024-06-02 PROCEDURE — 99283 EMERGENCY DEPT VISIT LOW MDM: CPT

## 2024-06-02 PROCEDURE — 93005 ELECTROCARDIOGRAM TRACING: CPT

## 2024-06-02 PROCEDURE — 36415 COLL VENOUS BLD VENIPUNCTURE: CPT | Performed by: STUDENT IN AN ORGANIZED HEALTH CARE EDUCATION/TRAINING PROGRAM

## 2024-06-02 PROCEDURE — 71101 X-RAY EXAM UNILAT RIBS/CHEST: CPT | Mod: LT

## 2024-06-02 RX ORDER — LIDOCAINE 560 MG/1
1 PATCH PERCUTANEOUS; TOPICAL; TRANSDERMAL ONCE
Status: DISCONTINUED | OUTPATIENT
Start: 2024-06-02 | End: 2024-06-02 | Stop reason: HOSPADM

## 2024-06-02 RX ADMIN — LIDOCAINE 4% 1 PATCH: 40 PATCH TOPICAL at 16:12

## 2024-06-02 ASSESSMENT — PAIN DESCRIPTION - LOCATION: LOCATION: RIB CAGE

## 2024-06-02 ASSESSMENT — PAIN SCALES - GENERAL: PAINLEVEL_OUTOF10: 6

## 2024-06-02 ASSESSMENT — PAIN DESCRIPTION - DESCRIPTORS: DESCRIPTORS: ACHING

## 2024-06-02 ASSESSMENT — PAIN DESCRIPTION - PAIN TYPE: TYPE: ACUTE PAIN

## 2024-06-02 ASSESSMENT — LIFESTYLE VARIABLES
EVER HAD A DRINK FIRST THING IN THE MORNING TO STEADY YOUR NERVES TO GET RID OF A HANGOVER: NO
TOTAL SCORE: 0
HAVE YOU EVER FELT YOU SHOULD CUT DOWN ON YOUR DRINKING: NO
HAVE PEOPLE ANNOYED YOU BY CRITICIZING YOUR DRINKING: NO
EVER FELT BAD OR GUILTY ABOUT YOUR DRINKING: NO

## 2024-06-02 ASSESSMENT — PAIN DESCRIPTION - ORIENTATION: ORIENTATION: LEFT

## 2024-06-02 ASSESSMENT — PAIN - FUNCTIONAL ASSESSMENT: PAIN_FUNCTIONAL_ASSESSMENT: 0-10

## 2024-06-02 NOTE — ED PROVIDER NOTES
HPI   Chief Complaint   Patient presents with    Rib Injury       Patient is a 64-year-old male presenting for left-sided chest pain.  The patient was laying down doing work in his attic and felt a popping sensation in the left side of the chest.  He had some indentation to the skin with significant tenderness palpation of that area.  No puncture wound or laceration or abrasion.  The patient states that anytime he takes a deep breath it hurts significantly with cough.  Denies any fevers or chills, nausea or vomiting, abdominal trauma or pain                          Shona Coma Scale Score: 15                     Patient History   Past Medical History:   Diagnosis Date    Personal history of pneumonia (recurrent) 09/06/2019    History of pneumonia    Solitary pulmonary nodule 01/13/2021    Lung nodule     Past Surgical History:   Procedure Laterality Date    MR HEAD ANGIO WO IV CONTRAST  10/2/2017    MR HEAD ANGIO WO IV CONTRAST 10/2/2017 [facility]    MR NECK ANGIO WO IV CONTRAST  10/2/2017    MR NECK ANGIO WO IV CONTRAST 10/2/2017 [facility]ICAL [facility]GAC[facility]     Family History   Problem Relation Name Age of Onset    Asthma Mother      Breast cancer Mother      Other (Cardiac arrhythmia) Father      Breast cancer Maternal Grandmother       Social History     Tobacco Use    Smoking status: Never    Smokeless tobacco: Never   Substance Use Topics    Alcohol use: Not on file    Drug use: Never       Physical Exam   ED Triage Vitals [06/02/24 1555]   Temperature Heart Rate Respirations BP   36.9 °C (98.4 °F) 100 (!) 97 168/80      Pulse Ox Temp Source Heart Rate Source Patient Position   97 % Temporal Monitor --      BP Location FiO2 (%)     -- --       Physical Exam  Constitutional:       General: He is not in acute distress.     Appearance: He is not toxic-appearing.   Cardiovascular:      Rate and Rhythm: Normal rate and regular rhythm.   Pulmonary:      Breath sounds: Normal breath sounds.   Abdominal:       Palpations: Abdomen is soft.      Tenderness: There is no abdominal tenderness.   Musculoskeletal:      Comments: Tenderness to palpation over the lateral left rib cage around rib 5, 6 and 7.  No obvious deformity or crepitus.   Neurological:      General: No focal deficit present.      Mental Status: He is alert and oriented to person, place, and time.         ED Course & MDM   ED Course as of 06/02/24 1723   Sun Jun 02, 2024   1609 EKG interpreted as sinus rhythm at a rate of 93 bpm, normal axis, no ST elevations depressions or T wave inversions system ischemia, QTc of 440. [AV]      ED Course User Index  [AV] Raz Anna MD         Diagnoses as of 06/02/24 1723   Contusion of rib on left side, initial encounter       Medical Decision Making  Patient is a 64-year-old male presenting for left-sided chest pain.  I am most concerned about traumatic injury including but not limited to rib fracture, rib contusion or muscular strain.  We did obtain an x-ray which was independently interpreted and negative for pneumothorax or hemothorax.  I do not see any obvious displaced rib fracture or flail segment.  Patient has a nonischemic EKG and given the description of the pain I am extremely less likely concerned about acute coronary syndrome.  Laboratory values were obtained given the patient's history of cirrhosis with lack of follow-up with hepatology.  LFTs appear improved however the patient still has mild thrombocytopenia and elevated INR.  I did encourage follow-up with hepatology.  With regards to the patient's current symptoms I do think it is contusion versus nondisplaced rib fracture.  We were able to give the patient a incentive spirometer to prevent atelectasis or pneumonia.  Was given a Lidoderm patch here in the emergency department.  Offered prescription home but stated that he would prefer over-the-counter.  He also stated that he would take naproxen.  Heart score is 2.        Procedure  Procedures      Raz Anna MD  06/02/24 3328

## 2024-06-02 NOTE — ED TRIAGE NOTES
Patient c/o left sided rib pain that started after he was laying on his left side in his attic installing new wiring, he repositioned himself on one of the joists and he heard a pop in his left ribs. Patient states that it hurts when he takes a deep breath.

## 2024-06-09 LAB
ATRIAL RATE: 93 BPM
P AXIS: 46 DEGREES
P OFFSET: 199 MS
P ONSET: 149 MS
PR INTERVAL: 154 MS
Q ONSET: 226 MS
QRS COUNT: 15 BEATS
QRS DURATION: 72 MS
QT INTERVAL: 354 MS
QTC CALCULATION(BAZETT): 440 MS
QTC FREDERICIA: 410 MS
R AXIS: 24 DEGREES
T AXIS: 39 DEGREES
T OFFSET: 403 MS
VENTRICULAR RATE: 93 BPM

## 2024-06-18 ENCOUNTER — TELEPHONE (OUTPATIENT)
Dept: PRIMARY CARE | Facility: CLINIC | Age: 64
End: 2024-06-18
Payer: COMMERCIAL

## 2024-06-18 NOTE — TELEPHONE ENCOUNTER
Patient wanting escitalopram removed from his chart. The pharmacy keeps trying to refill it. Epic won't let me discontinue it.

## 2024-10-24 ENCOUNTER — OFFICE VISIT (OUTPATIENT)
Dept: PRIMARY CARE | Facility: CLINIC | Age: 64
End: 2024-10-24
Payer: COMMERCIAL

## 2024-10-24 VITALS
HEART RATE: 102 BPM | BODY MASS INDEX: 27.76 KG/M2 | OXYGEN SATURATION: 95 % | TEMPERATURE: 98.1 F | WEIGHT: 172 LBS | DIASTOLIC BLOOD PRESSURE: 70 MMHG | SYSTOLIC BLOOD PRESSURE: 110 MMHG

## 2024-10-24 DIAGNOSIS — J01.00 ACUTE NON-RECURRENT MAXILLARY SINUSITIS: Primary | ICD-10-CM

## 2024-10-24 PROCEDURE — 99213 OFFICE O/P EST LOW 20 MIN: CPT | Performed by: FAMILY MEDICINE

## 2024-10-24 PROCEDURE — 3078F DIAST BP <80 MM HG: CPT | Performed by: FAMILY MEDICINE

## 2024-10-24 PROCEDURE — 3074F SYST BP LT 130 MM HG: CPT | Performed by: FAMILY MEDICINE

## 2024-10-24 RX ORDER — METHYLPREDNISOLONE 4 MG/1
TABLET ORAL
Qty: 21 TABLET | Refills: 0 | Status: SHIPPED | OUTPATIENT
Start: 2024-10-24 | End: 2024-10-31

## 2024-10-24 RX ORDER — AMOXICILLIN AND CLAVULANATE POTASSIUM 875; 125 MG/1; MG/1
875 TABLET, FILM COATED ORAL 2 TIMES DAILY
Qty: 20 TABLET | Refills: 0 | Status: SHIPPED | OUTPATIENT
Start: 2024-10-24 | End: 2024-11-03

## 2024-10-24 RX ORDER — DESVENLAFAXINE 50 MG/1
TABLET, EXTENDED RELEASE ORAL
COMMUNITY
Start: 2023-01-26 | End: 2024-10-24 | Stop reason: ALTCHOICE

## 2024-10-24 ASSESSMENT — PATIENT HEALTH QUESTIONNAIRE - PHQ9
1. LITTLE INTEREST OR PLEASURE IN DOING THINGS: NOT AT ALL
SUM OF ALL RESPONSES TO PHQ9 QUESTIONS 1 AND 2: 0
2. FEELING DOWN, DEPRESSED OR HOPELESS: NOT AT ALL

## 2024-11-07 PROBLEM — J45.901 ASTHMA EXACERBATION (HHS-HCC): Status: RESOLVED | Noted: 2023-08-07 | Resolved: 2024-11-07

## 2024-11-07 PROBLEM — R06.2 WHEEZING: Status: RESOLVED | Noted: 2023-08-07 | Resolved: 2024-11-07

## 2024-11-07 PROBLEM — U07.1 DISEASE DUE TO SEVERE ACUTE RESPIRATORY SYNDROME CORONAVIRUS 2 (SARS-COV-2): Status: RESOLVED | Noted: 2023-08-07 | Resolved: 2024-11-07

## 2024-11-07 PROBLEM — U07.1 COVID-19 VIRUS DETECTED: Status: RESOLVED | Noted: 2023-08-07 | Resolved: 2024-11-07

## 2024-11-07 PROBLEM — F10.10 ALCOHOL ABUSE: Status: RESOLVED | Noted: 2023-12-21 | Resolved: 2024-11-07

## 2024-11-07 PROBLEM — K63.5 COLON POLYPS: Status: RESOLVED | Noted: 2023-08-07 | Resolved: 2024-11-07

## 2024-11-07 PROBLEM — J45.909 ASTHMA: Status: RESOLVED | Noted: 2023-08-07 | Resolved: 2024-11-07

## 2024-11-07 ASSESSMENT — ENCOUNTER SYMPTOMS
FEVER: 0
JOINT SWELLING: 0
SINUS PAIN: 1
RHINORRHEA: 1
HEADACHES: 0
FREQUENCY: 0
TROUBLE SWALLOWING: 0
CONFUSION: 0
WEAKNESS: 0
EYE PAIN: 0
PALPITATIONS: 0
VOMITING: 0
HEMATURIA: 0
BLOOD IN STOOL: 0
SORE THROAT: 0
UNEXPECTED WEIGHT CHANGE: 0
HALLUCINATIONS: 0
NUMBNESS: 0
SHORTNESS OF BREATH: 0
DECREASED CONCENTRATION: 0
ABDOMINAL PAIN: 0
DIZZINESS: 0
DIARRHEA: 0
FATIGUE: 0
DYSURIA: 0
SINUS PRESSURE: 1
NAUSEA: 0
COUGH: 0

## 2024-11-07 NOTE — PROGRESS NOTES
Subjective   Patient ID: Wiliam Sim is a 64 y.o. male.    Patient comes in with about a weeks worth of thick nasal drainage, sinus pressure, fatigue, malaise and now mild cough.  He has teeth pain.  It feels like a typical sinusitis.  He does not smoke.  He does not get recurrently.        Review of Systems   Constitutional:  Negative for fatigue, fever and unexpected weight change.   HENT:  Positive for congestion, rhinorrhea, sinus pressure and sinus pain. Negative for ear pain, hearing loss, sore throat and trouble swallowing.    Eyes:  Negative for pain and visual disturbance.   Respiratory:  Negative for cough and shortness of breath.    Cardiovascular:  Negative for chest pain, palpitations and leg swelling.   Gastrointestinal:  Negative for abdominal pain, blood in stool, diarrhea, nausea and vomiting.   Genitourinary:  Negative for dysuria, frequency, hematuria and urgency.   Musculoskeletal:  Negative for joint swelling.   Skin:  Negative for pallor and rash.   Neurological:  Negative for dizziness, syncope, weakness, numbness and headaches.   Psychiatric/Behavioral:  Negative for confusion, decreased concentration, hallucinations and suicidal ideas.      Vitals:    10/24/24 1533   BP: 110/70   Pulse: 102   Temp: 36.7 °C (98.1 °F)   SpO2: 95%      Objective   Physical Exam  Constitutional:       Appearance: Normal appearance.   HENT:      Head: Normocephalic and atraumatic.      Right Ear: Tympanic membrane normal.      Left Ear: Tympanic membrane normal.      Nose: Congestion and rhinorrhea present.      Mouth/Throat:      Mouth: Mucous membranes are moist.      Pharynx: Posterior oropharyngeal erythema present.   Eyes:      Extraocular Movements: Extraocular movements intact.      Conjunctiva/sclera: Conjunctivae normal.      Pupils: Pupils are equal, round, and reactive to light.   Cardiovascular:      Rate and Rhythm: Normal rate and regular rhythm.      Heart sounds: Normal heart sounds.    Pulmonary:      Effort: Pulmonary effort is normal.      Breath sounds: Normal breath sounds.   Musculoskeletal:      Cervical back: Neck supple. No rigidity or tenderness.      Right lower leg: No edema.      Left lower leg: No edema.   Lymphadenopathy:      Cervical: No cervical adenopathy.   Skin:     General: Skin is warm and dry.   Neurological:      General: No focal deficit present.      Mental Status: He is alert.   Psychiatric:         Mood and Affect: Mood normal.         Speech: Speech normal.         Behavior: Behavior normal.         Cognition and Memory: Cognition normal.         Assessment/Plan   Diagnoses and all orders for this visit:  Acute non-recurrent maxillary sinusitis  -     methylPREDNISolone (Medrol Dospak) 4 mg tablets; Take as directed on package.  -     amoxicillin-pot clavulanate (Augmentin) 875-125 mg tablet; Take 1 tablet (875 mg) by mouth 2 times a day for 10 days.

## 2024-11-07 NOTE — PATIENT INSTRUCTIONS
You have a sinus infection.  I am giving you an antibiotic to clear it out.  Over-the-counter Mucinex works well along with nasal saline and Flonase.  Tylenol or Advil as tolerated for pain.  Rest, drink fluids and please follow-up if symptoms persist or worsen.

## 2024-11-21 ENCOUNTER — TELEPHONE (OUTPATIENT)
Dept: PRIMARY CARE | Facility: CLINIC | Age: 64
End: 2024-11-21
Payer: COMMERCIAL

## 2024-11-21 DIAGNOSIS — F10.10 ALCOHOL ABUSE: Primary | ICD-10-CM

## 2024-11-21 RX ORDER — NALTREXONE HYDROCHLORIDE 50 MG/1
100 TABLET, FILM COATED ORAL DAILY
Qty: 20 TABLET | Refills: 2 | Status: SHIPPED | OUTPATIENT
Start: 2024-11-21 | End: 2024-12-01

## 2025-01-09 DIAGNOSIS — F10.10 ALCOHOL ABUSE: ICD-10-CM

## 2025-01-09 RX ORDER — NALTREXONE HYDROCHLORIDE 50 MG/1
50 TABLET, FILM COATED ORAL DAILY
Qty: 90 TABLET | Refills: 3 | Status: SHIPPED | OUTPATIENT
Start: 2025-01-09 | End: 2026-01-09

## 2025-10-16 ENCOUNTER — APPOINTMENT (OUTPATIENT)
Dept: PRIMARY CARE | Facility: CLINIC | Age: 65
End: 2025-10-16
Payer: COMMERCIAL